# Patient Record
Sex: MALE | Race: WHITE | NOT HISPANIC OR LATINO | Employment: FULL TIME | URBAN - METROPOLITAN AREA
[De-identification: names, ages, dates, MRNs, and addresses within clinical notes are randomized per-mention and may not be internally consistent; named-entity substitution may affect disease eponyms.]

---

## 2023-02-20 ENCOUNTER — OFFICE VISIT (OUTPATIENT)
Dept: INTERNAL MEDICINE | Facility: CLINIC | Age: 33
End: 2023-02-20
Payer: COMMERCIAL

## 2023-02-20 VITALS
DIASTOLIC BLOOD PRESSURE: 86 MMHG | SYSTOLIC BLOOD PRESSURE: 140 MMHG | BODY MASS INDEX: 33.95 KG/M2 | RESPIRATION RATE: 18 BRPM | HEIGHT: 68 IN | HEART RATE: 84 BPM | TEMPERATURE: 97.8 F | WEIGHT: 224 LBS | OXYGEN SATURATION: 98 %

## 2023-02-20 DIAGNOSIS — Z00.00 ANNUAL PHYSICAL EXAM: ICD-10-CM

## 2023-02-20 DIAGNOSIS — Z11.59 ENCOUNTER FOR HEPATITIS C SCREENING TEST FOR LOW RISK PATIENT: Primary | ICD-10-CM

## 2023-02-20 DIAGNOSIS — Z13.1 SCREENING FOR DIABETES MELLITUS: ICD-10-CM

## 2023-02-20 PROBLEM — R41.840 POOR CONCENTRATION: Status: ACTIVE | Noted: 2021-05-11

## 2023-02-20 PROBLEM — E66.9 OBESITY: Status: ACTIVE | Noted: 2021-05-11

## 2023-02-20 PROBLEM — F32.A DEPRESSIVE DISORDER: Status: ACTIVE | Noted: 2021-05-11

## 2023-02-20 PROBLEM — E66.9 OBESITY (BMI 30.0-34.9): Status: ACTIVE | Noted: 2023-02-20

## 2023-02-20 PROCEDURE — 99385 PREV VISIT NEW AGE 18-39: CPT | Performed by: PHYSICIAN ASSISTANT

## 2023-02-20 RX ORDER — DEXTROAMPHETAMINE SACCHARATE, AMPHETAMINE ASPARTATE MONOHYDRATE, DEXTROAMPHETAMINE SULFATE AND AMPHETAMINE SULFATE 3.75; 3.75; 3.75; 3.75 MG/1; MG/1; MG/1; MG/1
CAPSULE, EXTENDED RELEASE ORAL
COMMUNITY
Start: 2022-12-19

## 2023-02-20 NOTE — PROGRESS NOTES
Office Note     Name: Trae Mondragon    : 1990     MRN: [id number]     Chief Complaint  Establish Care (NP, w/labs)    Subjective     History of Present Illness:  Trae Mondragon is a 32 y.o. male who presents today to establish care.     Patient works as an ICU nurse.  Patient has no complaints at this time.    Preventative:  Patient does use a seatbelt, use sunscreen, helmet when necessary, smoke detectors and carbon monoxide detectors are in home, safe sexual practices and does not participate in illicit drug use.    Review of Systems:   Review of Systems   All other systems reviewed and are negative.      Past Medical History:   Past Medical History:   Diagnosis Date   • ADHD (attention deficit hyperactivity disorder)    • Obesity        Past Surgical History: History reviewed. No pertinent surgical history.    Immunizations:   Immunization History   Administered Date(s) Administered   • COVID-19 (PFIZER) BIVALENT BOOSTER 12+YRS 2022   • COVID-19 (PFIZER) PURPLE CAP 2020, 2021, 2021   • Flu Vaccine Quad PF 6-35MO 10/28/2019   • Flu Vaccine Split Quad 10/01/2021   • FluLaval/Fluzone >6mos 11/10/2022   • Hep B, Adolescent or Pediatric 1998, 1998, 1998   • Hepatitis B 2015, 2015, 2015   • Hib (PRP-T) 1998, 1998, 1998   • MMR 07/15/1993, 1997   • Td 05/10/2003   • Tdap 2015, 2022   • Varicella 1996, 2015        Medications:     Current Outpatient Medications:   •  amphetamine-dextroamphetamine XR (ADDERALL XR) 15 MG 24 hr capsule, , Disp: , Rfl:     Allergies:   No Known Allergies    Family History:   Family History   Problem Relation Age of Onset   • Anxiety disorder Mother    • Cancer Mother    • Depression Mother    • Thyroid disease Mother    • Hyperlipidemia Father    • Alcohol abuse Paternal Grandfather    • Anxiety disorder Sister    • Asthma Sister        Social History:   Social History  "    Socioeconomic History   • Marital status:    Tobacco Use   • Smoking status: Former     Types: Pipe   • Smokeless tobacco: Never   Vaping Use   • Vaping Use: Never used   Substance and Sexual Activity   • Alcohol use: Yes     Alcohol/week: 5.0 standard drinks     Types: 5 Cans of beer per week   • Drug use: Never   • Sexual activity: Yes     Partners: Female     Birth control/protection: I.U.D.         Objective     Vital Signs  /86   Pulse 84   Temp 97.8 °F (36.6 °C) (Infrared)   Resp 18   Ht 171.5 cm (67.5\")   Wt 102 kg (224 lb)   SpO2 98%   BMI 34.57 kg/m²   Estimated body mass index is 34.57 kg/m² as calculated from the following:    Height as of this encounter: 171.5 cm (67.5\").    Weight as of this encounter: 102 kg (224 lb).    BMI is >= 30 and <35. (Class 1 Obesity). The following options were offered after discussion;: weight loss educational material (shared in after visit summary)      Physical Exam  Vitals and nursing note reviewed. Exam conducted with a chaperone present.   Constitutional:       Appearance: Normal appearance.   HENT:      Right Ear: Tympanic membrane normal.      Left Ear: Tympanic membrane normal.      Mouth/Throat:      Mouth: Mucous membranes are moist.      Pharynx: Oropharynx is clear.   Eyes:      Extraocular Movements: Extraocular movements intact.      Pupils: Pupils are equal, round, and reactive to light.   Cardiovascular:      Rate and Rhythm: Normal rate and regular rhythm.      Pulses: Normal pulses.      Heart sounds: Normal heart sounds.   Pulmonary:      Effort: Pulmonary effort is normal.      Breath sounds: Normal breath sounds.   Abdominal:      General: Abdomen is flat. Bowel sounds are normal.      Palpations: Abdomen is soft.   Genitourinary:     Comments: Patient politely deferred  At this time was counseled to have self exams monthly and to call immediately with any lumps, bumps, pain, rash.  Patient verbalized " understanding.  Musculoskeletal:         General: Normal range of motion.   Skin:     General: Skin is warm.   Neurological:      General: No focal deficit present.      Mental Status: He is alert.   Psychiatric:         Mood and Affect: Mood normal.         Behavior: Behavior normal.         Thought Content: Thought content normal.         Judgment: Judgment normal.          Assessment and Plan     1. Encounter for hepatitis C screening test for low risk patient    - Hepatitis C Antibody; Future    2. Annual physical exam    - Hepatitis C Antibody; Future  - Comprehensive Metabolic Panel; Future  - Basic Metabolic Panel; Future  - Lipid Panel; Future  - TSH; Future  - Hemoglobin A1c; Future    3. Screening for diabetes mellitus  Patient requested, aware it may not be covered by [facility]  - Hemoglobin A1c; Future       Counseling was given to patient and family for the following topics: instructions for management, risk factor reductions, prognosis, patient and family education, impressions, risks and benefits of treatment options and importance of treatment compliance.    Follow Up  No follow-ups on file.    TELMA [facility]  [address] [address] [phone]

## 2023-02-21 ENCOUNTER — LAB (OUTPATIENT)
Dept: LAB | Facility: HOSPITAL | Age: 33
End: 2023-02-21
Payer: COMMERCIAL

## 2023-02-21 DIAGNOSIS — Z00.00 ANNUAL PHYSICAL EXAM: ICD-10-CM

## 2023-02-21 DIAGNOSIS — Z11.59 ENCOUNTER FOR HEPATITIS C SCREENING TEST FOR LOW RISK PATIENT: ICD-10-CM

## 2023-02-21 DIAGNOSIS — Z13.1 SCREENING FOR DIABETES MELLITUS: ICD-10-CM

## 2023-02-21 LAB
ALBUMIN SERPL-MCNC: 5.1 G/DL (ref 3.5–5.2)
ALBUMIN/GLOB SERPL: 2.2 G/DL
ALP SERPL-CCNC: 60 U/L (ref 39–117)
ALT SERPL W P-5'-P-CCNC: 32 U/L (ref 1–41)
ANION GAP SERPL CALCULATED.3IONS-SCNC: 10.7 MMOL/L (ref 5–15)
AST SERPL-CCNC: 20 U/L (ref 1–40)
BILIRUB SERPL-MCNC: 0.6 MG/DL (ref 0–1.2)
BUN SERPL-MCNC: 15 MG/DL (ref 6–20)
BUN/CREAT SERPL: 15.6 (ref 7–25)
CALCIUM SPEC-SCNC: 10.4 MG/DL (ref 8.6–10.5)
CHLORIDE SERPL-SCNC: 102 MMOL/L (ref 98–107)
CHOLEST SERPL-MCNC: 179 MG/DL (ref 0–200)
CO2 SERPL-SCNC: 26.3 MMOL/L (ref 22–29)
CREAT SERPL-MCNC: 0.96 MG/DL (ref 0.76–1.27)
EGFRCR SERPLBLD CKD-EPI 2021: 109 ML/MIN/1.73
GLOBULIN UR ELPH-MCNC: 2.3 GM/DL
GLUCOSE SERPL-MCNC: 93 MG/DL (ref 65–99)
HBA1C MFR BLD: 5.3 % (ref 4.8–5.6)
HCV AB SER DONR QL: NORMAL
HDLC SERPL-MCNC: 36 MG/DL (ref 40–60)
LDLC SERPL CALC-MCNC: 126 MG/DL (ref 0–100)
LDLC/HDLC SERPL: 3.45 {RATIO}
POTASSIUM SERPL-SCNC: 4.5 MMOL/L (ref 3.5–5.2)
PROT SERPL-MCNC: 7.4 G/DL (ref 6–8.5)
SODIUM SERPL-SCNC: 139 MMOL/L (ref 136–145)
TRIGL SERPL-MCNC: 94 MG/DL (ref 0–150)
TSH SERPL DL<=0.05 MIU/L-ACNC: 1.31 UIU/ML (ref 0.27–4.2)
VLDLC SERPL-MCNC: 17 MG/DL (ref 5–40)

## 2023-02-21 PROCEDURE — 80061 LIPID PANEL: CPT

## 2023-02-21 PROCEDURE — 83036 HEMOGLOBIN GLYCOSYLATED A1C: CPT

## 2023-02-21 PROCEDURE — 84443 ASSAY THYROID STIM HORMONE: CPT

## 2023-02-21 PROCEDURE — 80053 COMPREHEN METABOLIC PANEL: CPT

## 2023-02-21 PROCEDURE — 86803 HEPATITIS C AB TEST: CPT

## 2023-02-22 ENCOUNTER — PATIENT ROUNDING (BHMG ONLY) (OUTPATIENT)
Dept: INTERNAL MEDICINE | Facility: CLINIC | Age: 33
End: 2023-02-22
Payer: COMMERCIAL

## 2023-02-22 NOTE — PROGRESS NOTES
A [employer] message has been sent to the patient for patient rounding with [facility].